# Patient Record
Sex: FEMALE | Race: WHITE
[De-identification: names, ages, dates, MRNs, and addresses within clinical notes are randomized per-mention and may not be internally consistent; named-entity substitution may affect disease eponyms.]

---

## 2017-01-02 ENCOUNTER — HOSPITAL ENCOUNTER (OUTPATIENT)
Dept: HOSPITAL 45 - C.RADBC | Age: 67
Discharge: HOME | End: 2017-01-02
Attending: INTERNAL MEDICINE
Payer: COMMERCIAL

## 2017-01-02 DIAGNOSIS — M48.06: Primary | ICD-10-CM

## 2017-01-02 DIAGNOSIS — R26.9: ICD-10-CM

## 2017-01-02 LAB
BASOPHILS # BLD: 0.03 K/UL (ref 0–0.2)
BASOPHILS NFR BLD: 0.4 %
COMPLETE: YES
EOSINOPHIL NFR BLD AUTO: 281 K/UL (ref 130–400)
FERRITIN SERPL-MCNC: 44.1 NG/ML (ref 8–388)
HCT VFR BLD CALC: 37.6 % (ref 37–47)
IG%: 0.1 %
IMM GRANULOCYTES NFR BLD AUTO: 29.2 %
LYMPHOCYTES # BLD: 2.05 K/UL (ref 1.2–3.4)
MCH RBC QN AUTO: 29.2 PG (ref 25–34)
MCHC RBC AUTO-ENTMCNC: 33.2 G/DL (ref 32–36)
MCV RBC AUTO: 87.9 FL (ref 80–100)
MONOCYTES NFR BLD: 7.7 %
NEUTROPHILS # BLD AUTO: 3 %
NEUTROPHILS NFR BLD AUTO: 59.6 %
PMV BLD AUTO: 9 FL (ref 7.4–10.4)
RBC # BLD AUTO: 4.28 M/UL (ref 4.2–5.4)
TIBC SERPL-MCNC: 381 MCG/DL (ref 250–450)
WBC # BLD AUTO: 7.03 K/UL (ref 4.8–10.8)

## 2017-03-06 ENCOUNTER — HOSPITAL ENCOUNTER (OUTPATIENT)
Dept: HOSPITAL 45 - C.LABPBG | Age: 67
Discharge: HOME | End: 2017-03-06
Attending: INTERNAL MEDICINE
Payer: COMMERCIAL

## 2017-03-06 DIAGNOSIS — Z11.59: Primary | ICD-10-CM

## 2017-03-06 DIAGNOSIS — E03.9: ICD-10-CM

## 2018-02-16 ENCOUNTER — HOSPITAL ENCOUNTER (EMERGENCY)
Dept: HOSPITAL 45 - C.EDB | Age: 68
Discharge: HOME | End: 2018-02-16
Payer: COMMERCIAL

## 2018-02-16 VITALS
WEIGHT: 163.58 LBS | BODY MASS INDEX: 26.29 KG/M2 | HEIGHT: 65.98 IN | HEIGHT: 65.98 IN | WEIGHT: 163.58 LBS | BODY MASS INDEX: 26.29 KG/M2

## 2018-02-16 VITALS — SYSTOLIC BLOOD PRESSURE: 157 MMHG | DIASTOLIC BLOOD PRESSURE: 81 MMHG

## 2018-02-16 VITALS — OXYGEN SATURATION: 99 % | HEART RATE: 80 BPM

## 2018-02-16 VITALS — TEMPERATURE: 98.06 F

## 2018-02-16 DIAGNOSIS — R19.7: ICD-10-CM

## 2018-02-16 DIAGNOSIS — E03.9: ICD-10-CM

## 2018-02-16 DIAGNOSIS — Z79.899: ICD-10-CM

## 2018-02-16 DIAGNOSIS — Z82.49: ICD-10-CM

## 2018-02-16 DIAGNOSIS — J06.9: Primary | ICD-10-CM

## 2018-02-16 DIAGNOSIS — Z90.89: ICD-10-CM

## 2018-02-16 DIAGNOSIS — Z83.3: ICD-10-CM

## 2018-02-16 DIAGNOSIS — Z86.711: ICD-10-CM

## 2018-02-16 DIAGNOSIS — Z98.51: ICD-10-CM

## 2018-02-16 DIAGNOSIS — E87.6: ICD-10-CM

## 2018-02-16 LAB
ALBUMIN SERPL-MCNC: 3.4 GM/DL (ref 3.4–5)
ALP SERPL-CCNC: 93 U/L (ref 45–117)
ALT SERPL-CCNC: 30 U/L (ref 12–78)
AST SERPL-CCNC: 29 U/L (ref 15–37)
BASOPHILS # BLD: 0.01 K/UL (ref 0–0.2)
BASOPHILS NFR BLD: 0.2 %
BUN SERPL-MCNC: 5 MG/DL (ref 7–18)
CALCIUM SERPL-MCNC: 9.5 MG/DL (ref 8.5–10.1)
CO2 SERPL-SCNC: 28 MMOL/L (ref 21–32)
CREAT SERPL-MCNC: 0.96 MG/DL (ref 0.6–1.2)
EOS ABS #: 0.04 K/UL (ref 0–0.5)
EOSINOPHIL NFR BLD AUTO: 284 K/UL (ref 130–400)
GLUCOSE SERPL-MCNC: 103 MG/DL (ref 70–99)
HCT VFR BLD CALC: 34.5 % (ref 37–47)
HGB BLD-MCNC: 12.4 G/DL (ref 12–16)
IG#: 0.02 K/UL (ref 0–0.02)
IMM GRANULOCYTES NFR BLD AUTO: 34.1 %
INFLUENZA B ANTIGEN: (no result)
LYMPHOCYTES # BLD: 1.8 K/UL (ref 1.2–3.4)
MCH RBC QN AUTO: 29.6 PG (ref 25–34)
MCHC RBC AUTO-ENTMCNC: 35.9 G/DL (ref 32–36)
MCV RBC AUTO: 82.3 FL (ref 80–100)
MONO ABS #: 0.53 K/UL (ref 0.11–0.59)
MONOCYTES NFR BLD: 10 %
NEUT ABS #: 2.88 K/UL (ref 1.4–6.5)
NEUTROPHILS # BLD AUTO: 0.8 %
NEUTROPHILS NFR BLD AUTO: 54.5 %
PMV BLD AUTO: 8.2 FL (ref 7.4–10.4)
POTASSIUM SERPL-SCNC: 2.6 MMOL/L (ref 3.5–5.1)
PROT SERPL-MCNC: 7.1 GM/DL (ref 6.4–8.2)
RED CELL DISTRIBUTION WIDTH CV: 11.9 % (ref 11.5–14.5)
RED CELL DISTRIBUTION WIDTH SD: 36 FL (ref 36.4–46.3)
SODIUM SERPL-SCNC: 127 MMOL/L (ref 136–145)
WBC # BLD AUTO: 5.28 K/UL (ref 4.8–10.8)

## 2018-02-16 NOTE — EMERGENCY ROOM VISIT NOTE
History


Report prepared by Natalee:  Ab Jones


Under the Supervision of:  Dr. Alessandra Hassan D.O.


First contact with patient:  17:04


Chief Complaint:  COUGH


Stated Complaint:  COUGH


Nursing Triage Summary:  


triage note;





pt reports cough x 1 week. "they gave me an antibiotic but it is not working."





pt reports diarrhea x 1 week.  





pt had flu test done out pt that was neg.





History of Present Illness


The patient is a 67 year old female who presents to the Emergency Room with 

complaints of a persistent dry cough for the past week. The patient states that 

four days ago she was put on doxycycline for a bronchitis, though she states 

that it has not made her feel better. She states that she went back to her PCP 

this morning, and they sent her here for fluids for her low blood pressure, 

antibiotics, and a chest x-ray. She states that she occasionally coughs up 

yellow phlegm. The patient denies any history of asthma or COPD, though she 

states that she smoked for 15-20 years. She reports that she had diarrhea twice 

this morning, and there was no blood in it. She states that a week ago she had 

diarrhea, vomiting, lack of appetite, and no energy. She denies any aches, 

fevers, chills, trouble urinating, and any leg swelling. The patient reports 

that she did not get her flu shot this year. The patient states that she 

adjusted her blood pressure medications within the last two months.





   Source of History:  patient


   Onset:  the past week


   Position:  other (global)


   Quality:  other (cough)


   Timing:  other (persistent)


   Associated Symptoms:  + diarrhea, No fevers, No chills





Review of Systems


See HPI for pertinent positives & negatives. A total of 10 systems reviewed and 

were otherwise negative.





Past Medical & Surgical


Medical Problems:


(1) History of - pulmonary embolus


(2) History of - tubal ligation


(3) History of appendectomy


(4) Hypothyroidism


(5) Microscopic hematuria


(6) Pulmonary embolism


Surgical Problems:


(1) Post-operative state








Family History





Diabetes mellitus


FH: heart disease


FH: lung disease


Hypertension





Social History


Smoking Status:  Never Smoker


Alcohol Use:  none


Drug Use:  none


Marital Status:  


Housing Status:  lives with significant other


Occupation Status:  unemployed





Current/Historical Medications


Scheduled


Carvedilol (Carvedilol), 25 MG PO BID


Cholecalciferol (Vitamin D3), 1,000 UNITS PO QAM


Doxycycline Monohydrate (Monodox), 100 MG PO BID


Duloxetine HCl (Cymbalta), 30 MG PO DAILY


Duloxetine Hcl (Cymbalta), 60 MG PO DAILY


Hydrochlorothiazide (Hctz), 25 MG PO QAM


Hydroxychloroquine Sulfate (Plaquenil), 200 MG PO BID


Irbesartan (Irbesartan), 300 MG PO QAM


Levothyroxine Sodium (Synthroid), 100 MCG PO DAILY


Potassium Chloride (K-Tabs), 1 TAB PO DAILY


Prednisone (Prednisone Tab), 0 PO DAILY


Pregabalin (Lyrica), 50 MG PO BID


Simvastatin (Zocor), 40 MG PO QPM





Scheduled PRN


Acetaminophen (Tylenol), 1,000 MG PO BID PRN for Pain


Benzonatate (Tessalon Perles), 100 MG PO TID PRN for Cough


Betamethasone Dipropionate (To (Betamethasone Dipropionat), 1 APPLN TOP DAILY 

PRN for RASH


Ibuprofen (Advil), 400 MG PO Q6 PRN for Headache or Pain


Zolpidem Tartrate (Ambien Er), 12.5 MG PO HS PRN for Sleep





Allergies


Coded Allergies:  


     Codeine (Verified  Adverse Reaction, Severe, GI SYMPTOMS, 2/16/18)


     Morphine (Verified  Adverse Reaction, Severe, GI SYMPTOMS, 2/16/18)





Physical Exam


Vital Signs











  Date Time  Temp Pulse Resp B/P (MAP) Pulse Ox O2 Delivery O2 Flow Rate FiO2


 


2/16/18 19:55    155/77    





    157/81    


 


2/16/18 19:44  80 20 154/81 99 Room Air  


 


2/16/18 18:48  65 18 146/68 100 Nebulizer  


 


2/16/18 17:42  63 18 121/90 99 Room Air  


 


2/16/18 17:25     99 Room Air  


 


2/16/18 14:23 36.7 68 20 94/59 97 Room Air  











Physical Exam


GENERAL: alert, well appearing, well nourished, no distress, non-toxic 


EYE EXAM: normal conjunctiva, PERRL and EOM's grossly intact


OROPHARYNX: no exudate, no erythema, lips, buccal mucosa, and tongue normal and 

mucous membranes are moist. Dentures in place.


NECK: supple, no nuchal rigidity, no adenopathy, non-tender


LUNGS: Left base posteriorly has a rhonchitic sound. Normal chest wall mechanics


HEART: no murmurs, S1 normal and S2 normal 


ABDOMEN: abdomen soft, non-tender, normo-active bowel sounds, no masses, no 

rebound or guarding. 


BACK: Back is symmetrical on inspection and there is no deformity, no midline 

tenderness, no CVA tenderness. 


SKIN: no rashes and no bruising 


UPPER EXTREMITIES: upper extremities are grossly normal. 


LOWER EXTREMITIES: No pitting edema.


NEURO EXAM: Normal sensorium, cranial nerves II-XII grossly intact, normal 

speech,  no gross weakness of arms, no gross weakness of legs.





Medical Decision & Procedures


ER Provider


Diagnostic Interpretation:


Radiology results have been interpreted by the radiologist and reviewed by me.











CHEST 2 VIEWS ROUTINE





HISTORY:      cough





COMPARISON: Chest 4/9/2015.





FINDINGS: Posterior fusion hardware seen within the lower thoracic and lumbar


spine. An IVC filter is partially visualized. Surgical clips within the right


upper quadrant. The lungs are clear. The heart is borderline enlarged. This


remains unchanged. No pleural effusions. No pneumothorax. Cervical spinal fusion


hardware is again noted. Mild pectus excavatum deformity, unchanged.





IMPRESSION:


No significant change compared to the prior study. No acute process.





Electronically signed by:  Franklin Reis M.D.


2/16/2018 6:34 PM





Dictated Date/Time:  2/16/2018 6:32 PM





Laboratory Results


2/16/18 18:05








Red Blood Count 4.19, Mean Corpuscular Volume 82.3, Mean Corpuscular Hemoglobin 

29.6, Mean Corpuscular Hemoglobin Concent 35.9, Mean Platelet Volume 8.2, 

Neutrophils (%) (Auto) 54.5, Lymphocytes (%) (Auto) 34.1, Monocytes (%) (Auto) 

10.0, Eosinophils (%) (Auto) 0.8, Basophils (%) (Auto) 0.2, Neutrophils # (Auto

) 2.88, Lymphocytes # (Auto) 1.80, Monocytes # (Auto) 0.53, Eosinophils # (Auto

) 0.04, Basophils # (Auto) 0.01





2/16/18 18:05

















Test


  2/16/18


17:55 2/16/18


18:05


 


Influenza Type A Antigen


  Neg for Influ


A (NEG) 


 


 


Influenza Type B Antigen


  Neg for Influ


B (NEG) 


 


 


White Blood Count


  


  5.28 K/uL


(4.8-10.8)


 


Red Blood Count


  


  4.19 M/uL


(4.2-5.4)


 


Hemoglobin


  


  12.4 g/dL


(12.0-16.0)


 


Hematocrit  34.5 % (37-47) 


 


Mean Corpuscular Volume


  


  82.3 fL


()


 


Mean Corpuscular Hemoglobin


  


  29.6 pg


(25-34)


 


Mean Corpuscular Hemoglobin


Concent 


  35.9 g/dl


(32-36)


 


Platelet Count


  


  284 K/uL


(130-400)


 


Mean Platelet Volume


  


  8.2 fL


(7.4-10.4)


 


Neutrophils (%) (Auto)  54.5 % 


 


Lymphocytes (%) (Auto)  34.1 % 


 


Monocytes (%) (Auto)  10.0 % 


 


Eosinophils (%) (Auto)  0.8 % 


 


Basophils (%) (Auto)  0.2 % 


 


Neutrophils # (Auto)


  


  2.88 K/uL


(1.4-6.5)


 


Lymphocytes # (Auto)


  


  1.80 K/uL


(1.2-3.4)


 


Monocytes # (Auto)


  


  0.53 K/uL


(0.11-0.59)


 


Eosinophils # (Auto)


  


  0.04 K/uL


(0-0.5)


 


Basophils # (Auto)


  


  0.01 K/uL


(0-0.2)


 


RDW Standard Deviation


  


  36.0 fL


(36.4-46.3)


 


RDW Coefficient of Variation


  


  11.9 %


(11.5-14.5)


 


Immature Granulocyte % (Auto)  0.4 % 


 


Immature Granulocyte # (Auto)


  


  0.02 K/uL


(0.00-0.02)


 


Anion Gap


  


  12.0 mmol/L


(3-11)


 


Est Creatinine Clear Calc


Drug Dose 


  59.4 ml/min 


 


 


Estimated GFR (


American) 


  71.4 


 


 


Estimated GFR (Non-


American 


  61.6 


 


 


BUN/Creatinine Ratio  5.7 (10-20) 


 


Calcium Level


  


  9.5 mg/dl


(8.5-10.1)


 


Total Bilirubin


  


  1.1 mg/dl


(0.2-1)


 


Aspartate Amino Transf


(AST/SGOT) 


  29 U/L (15-37) 


 


 


Alanine Aminotransferase


(ALT/SGPT) 


  30 U/L (12-78) 


 


 


Alkaline Phosphatase


  


  93 U/L


()


 


Troponin I


  


  < 0.015 ng/ml


(0-0.045)


 


Pro-B-Type Natriuretic Peptide


  


  123 pg/ml


(0-900)


 


Total Protein


  


  7.1 gm/dl


(6.4-8.2)


 


Albumin


  


  3.4 gm/dl


(3.4-5.0)


 


Globulin


  


  3.7 gm/dl


(2.5-4.0)


 


Albumin/Globulin Ratio  0.9 (0.9-2) 








Laboratory results per my review.





Medications Administered











 Medications


  (Trade)  Dose


 Ordered  Sig/Jamir


 Route  Start Time


 Stop Time Status Last Admin


Dose Admin


 


 Sodium Chloride  1,000 ml @ 


 250 mls/hr  Q4H STAT


 IV  2/16/18 17:16


 2/16/18 20:49 DC 2/16/18 18:43


250 MLS/HR


 


 Albuterol/


 Ipratropium


  (Duoneb)  3 ml  NOW  STAT


 INH  2/16/18 18:19


 2/16/18 18:20 DC 2/16/18 18:42


3 ML


 


 Benzonatate


  (Tessalon Perles


 Cap)  100 mg  NOW  ONCE


 PO  2/16/18 18:30


 2/16/18 18:31 DC 2/16/18 18:42


100 MG


 


 Potassium Chloride


  (Klor-Con M10)  40 meq  NOW  STAT


 PO  2/16/18 19:09


 2/16/18 19:10 DC 2/16/18 19:39


40 MEQ


 


 Prednisone


  (PredniSONE TAB)  60 mg  NOW  STAT


 PO  2/16/18 19:18


 2/16/18 19:19 DC 2/16/18 19:39


60 MG


 


 Albuterol


  (Ventolin Hfa


 Inhaler)  2 puffs  NOW  ONCE


 INH  2/16/18 20:00


 2/16/18 20:01 DC 2/16/18 20:00


2 PUFFS











ECG Per My Interpretation


Indication:  SOB/dyspnea


Rate (beats per minute):  62


Rhythm:  sinus rhythm


Findings:  1st degree AV block, Q waves (lead 3 and AVF), no acute ischemic 

change, other (T wave flattening in the precordial leads)





ED Course


1704: The patient was evaluated in room B7. A complete history and physical 

exam was performed. 





1716: Sodium Chloride 1000 ml @ 250 mls/hr IV





1819: DuoNeb 3ml INH





1830: Benzonatate 100mg PO





1850: I reevaluated the patient, and she is coughing less. I updated her on the 

results.





1909: Potassium Chloride 40meq PO





1918: Prednisone 60mg PO





1920: Upon reevaluation, the patient is feeling a little better. I discussed 

the findings and the treatment plan with the patient.  She verbalizes agreement 

and understanding.  She was discharged home.





2000: Albuterol 2 puffs INH





Medical Decision


Differential diagnosis:


Etiologies such as infections, reactive airway disease, pneumonia, pneumothorax

, COPD, CHF, cardiac ischemia, pulmonary embolism, musculoskeletal, 

gastrointestinal, as well as others were entertained.





Patient improved or following additional medications.  No overt evidence of 

failed outpatient treatment.  No hypoxia.  I feel initial hypotensive blood 

pressure readings are an error as the rest of her blood pressure readings were 

within normal limits.  Patient was given IV fluid rehydration as a precaution.  

Discussed with patient additional medications to help with her symptoms while 

she completes her course of antibiotics.  Discussed continued follow-up with 

family doctor, symptoms to watch and return for, she verbalized understanding 

was agreeable to plan.  I do not suspect additional cardiac etiology, doubt PE, 

tamponade, additional vascular etiology.  No evidence of bacteremia/sepsis.  

Patient well-appearing at discharge, tolerating by mouth, and ambulated with 

steady gait.  Patient's hypokalemia was noted and was repleted here.  

Additional bedside discussion regarding potassium-containing foods any recheck 

of her potassium level by her family doctor.  Patient's sodium level mildly 

decreased, however did receive normal saline as part of her IV rehydration.  

Discussed that this could be rechecked by her family doctor also.  No evidence 

of acute renal failure other kidney dysfunction.





Medication Reconcilliation


Current Medication List:  was personally reviewed by me





Blood Pressure Screening


Patient's blood pressure:  Elevated blood pressure


Blood pressure disposition:  Elevated BP felt to be situational





Impression





 Primary Impression:  


 Upper respiratory infection


 Additional Impression:  


 Hypokalemia





Scribe Attestation


The scribe's documentation has been prepared under my direction and personally 

reviewed by me in its entirety. I confirm that the note above accurately 

reflects all work, treatment, procedures, and medical decision making performed 

by me.





Departure Information


Dispostion


Home / Self-Care





Prescriptions





Potassium Chloride (K-TABS) 10 Meq Tab


1 TAB PO DAILY, #30


   Prov: Alessandra Hassan, DO         2/16/18 


Prednisone (Prednisone Tab) 20 Mg Tab


0 PO DAILY, #7 TAB


   2 TABS DAILY FOR 2 DAYS, THEN 1 TAB DAILY FOR 2 DAYS, THEN 1/2 TAB


   DAILY FOR 2 DAYS.


   Prov: Alessandra HassanYael, DO         2/16/18





Referrals


No Doctor, Assigned (PCP)





Forms


HOME CARE DOCUMENTATION FORM,                                                 

               IMPORTANT VISIT INFORMATION





Patient Instructions


My [facility]





Additional Instructions





Please continue your antibiotics as prescribed.  Please take the steroids as 

prescribed.  Please start taking a potassium supplement and have your family 

doctor recheck your potassium level next week.  It is likely lower due to your 

blood pressure medication.  You may use the inhaler and spacer up to every 4 

hours for fits of coughing or trouble breathing.  If you have any increased 

trouble breathing, noticed blood in your sputum, have recurrent fevers, develop 

vomiting or diarrhea, dizziness, have increased heartburn or indigestion, or 

you have any other new or concerning symptoms, please return the emergency room.





Problem Qualifiers








 Primary Impression:  


 Upper respiratory infection


 URI type:  unspecified URI  Qualified Codes:  J06.9 - Acute upper respiratory 

infection, unspecified

## 2018-02-16 NOTE — DIAGNOSTIC IMAGING REPORT
CHEST 2 VIEWS ROUTINE



HISTORY:      cough



COMPARISON: Chest 4/9/2015.



FINDINGS: Posterior fusion hardware seen within the lower thoracic and lumbar

spine. An IVC filter is partially visualized. Surgical clips within the right

upper quadrant. The lungs are clear. The heart is borderline enlarged. This

remains unchanged. No pleural effusions. No pneumothorax. Cervical spinal fusion

hardware is again noted. Mild pectus excavatum deformity, unchanged.



IMPRESSION:

No significant change compared to the prior study. No acute process.







Electronically signed by:  Franklin Reis M.D.

2/16/2018 6:34 PM



Dictated Date/Time:  2/16/2018 6:32 PM

## 2018-02-23 ENCOUNTER — HOSPITAL ENCOUNTER (OUTPATIENT)
Dept: HOSPITAL 45 - C.LABPBG | Age: 68
Discharge: HOME | End: 2018-02-23
Attending: INTERNAL MEDICINE
Payer: COMMERCIAL

## 2018-02-23 DIAGNOSIS — D64.9: Primary | ICD-10-CM

## 2018-02-23 LAB
BUN SERPL-MCNC: 8 MG/DL (ref 7–18)
CALCIUM SERPL-MCNC: 9.2 MG/DL (ref 8.5–10.1)
CO2 SERPL-SCNC: 31 MMOL/L (ref 21–32)
CREAT SERPL-MCNC: 0.81 MG/DL (ref 0.6–1.2)
GLUCOSE SERPL-MCNC: 102 MG/DL (ref 70–99)
POTASSIUM SERPL-SCNC: 3.2 MMOL/L (ref 3.5–5.1)
SODIUM SERPL-SCNC: 131 MMOL/L (ref 136–145)

## 2018-03-05 ENCOUNTER — HOSPITAL ENCOUNTER (OUTPATIENT)
Dept: HOSPITAL 45 - C.LABPBG | Age: 68
Discharge: HOME | End: 2018-03-05
Attending: INTERNAL MEDICINE
Payer: COMMERCIAL

## 2018-03-05 DIAGNOSIS — D64.9: Primary | ICD-10-CM

## 2018-03-05 LAB
BUN SERPL-MCNC: 5 MG/DL (ref 7–18)
CALCIUM SERPL-MCNC: 8.9 MG/DL (ref 8.5–10.1)
CO2 SERPL-SCNC: 25 MMOL/L (ref 21–32)
CREAT SERPL-MCNC: 0.9 MG/DL (ref 0.6–1.2)
GLUCOSE SERPL-MCNC: 112 MG/DL (ref 70–99)
POTASSIUM SERPL-SCNC: 4 MMOL/L (ref 3.5–5.1)
SODIUM SERPL-SCNC: 134 MMOL/L (ref 136–145)